# Patient Record
Sex: FEMALE | Race: WHITE | NOT HISPANIC OR LATINO
[De-identification: names, ages, dates, MRNs, and addresses within clinical notes are randomized per-mention and may not be internally consistent; named-entity substitution may affect disease eponyms.]

---

## 2017-01-05 ENCOUNTER — MEDICATION RENEWAL (OUTPATIENT)
Age: 62
End: 2017-01-05

## 2017-02-06 ENCOUNTER — APPOINTMENT (OUTPATIENT)
Dept: ENDOCRINOLOGY | Facility: CLINIC | Age: 62
End: 2017-02-06

## 2017-02-06 ENCOUNTER — MED ADMIN CHARGE (OUTPATIENT)
Age: 62
End: 2017-02-06

## 2017-02-06 RX ORDER — DENOSUMAB 60 MG/ML
60 INJECTION SUBCUTANEOUS
Qty: 1 | Refills: 0 | Status: COMPLETED | OUTPATIENT
Start: 2017-02-06

## 2017-02-06 RX ADMIN — DENOSUMAB 0 MG/ML: 60 INJECTION SUBCUTANEOUS at 00:00

## 2017-07-10 ENCOUNTER — RX RENEWAL (OUTPATIENT)
Age: 62
End: 2017-07-10

## 2017-07-20 ENCOUNTER — LABORATORY RESULT (OUTPATIENT)
Age: 62
End: 2017-07-20

## 2017-08-14 ENCOUNTER — APPOINTMENT (OUTPATIENT)
Dept: ENDOCRINOLOGY | Facility: CLINIC | Age: 62
End: 2017-08-14
Payer: COMMERCIAL

## 2017-08-14 PROCEDURE — 96401 CHEMO ANTI-NEOPL SQ/IM: CPT

## 2017-08-14 PROCEDURE — 99214 OFFICE O/P EST MOD 30 MIN: CPT | Mod: 25

## 2017-08-14 RX ORDER — DENOSUMAB 60 MG/ML
60 INJECTION SUBCUTANEOUS
Qty: 1 | Refills: 0 | Status: COMPLETED | OUTPATIENT
Start: 2017-08-14

## 2017-08-14 RX ADMIN — DENOSUMAB 0 MG/ML: 60 INJECTION SUBCUTANEOUS at 00:00

## 2018-01-16 ENCOUNTER — MEDICATION RENEWAL (OUTPATIENT)
Age: 63
End: 2018-01-16

## 2018-02-15 ENCOUNTER — TRANSCRIPTION ENCOUNTER (OUTPATIENT)
Age: 63
End: 2018-02-15

## 2018-02-15 ENCOUNTER — APPOINTMENT (OUTPATIENT)
Dept: ENDOCRINOLOGY | Facility: CLINIC | Age: 63
End: 2018-02-15
Payer: COMMERCIAL

## 2018-02-15 VITALS — DIASTOLIC BLOOD PRESSURE: 60 MMHG | SYSTOLIC BLOOD PRESSURE: 110 MMHG | HEART RATE: 54 BPM | OXYGEN SATURATION: 98 %

## 2018-02-15 VITALS — WEIGHT: 173 LBS | BODY MASS INDEX: 28.82 KG/M2 | HEIGHT: 65 IN

## 2018-02-15 PROCEDURE — 77080 DXA BONE DENSITY AXIAL: CPT

## 2018-02-15 PROCEDURE — ZZZZZ: CPT

## 2018-02-15 PROCEDURE — 99214 OFFICE O/P EST MOD 30 MIN: CPT | Mod: 25

## 2018-02-15 PROCEDURE — 96372 THER/PROPH/DIAG INJ SC/IM: CPT

## 2018-02-15 RX ORDER — DENOSUMAB 60 MG/ML
60 INJECTION SUBCUTANEOUS
Qty: 0 | Refills: 0 | Status: COMPLETED | OUTPATIENT
Start: 2018-02-15

## 2018-02-15 RX ADMIN — DENOSUMAB 0 MG/ML: 60 INJECTION SUBCUTANEOUS at 00:00

## 2018-06-29 ENCOUNTER — RX RENEWAL (OUTPATIENT)
Age: 63
End: 2018-06-29

## 2018-08-20 ENCOUNTER — LABORATORY RESULT (OUTPATIENT)
Age: 63
End: 2018-08-20

## 2018-08-20 ENCOUNTER — MEDICATION RENEWAL (OUTPATIENT)
Age: 63
End: 2018-08-20

## 2018-08-20 ENCOUNTER — APPOINTMENT (OUTPATIENT)
Dept: ENDOCRINOLOGY | Facility: CLINIC | Age: 63
End: 2018-08-20
Payer: COMMERCIAL

## 2018-08-20 ENCOUNTER — MED ADMIN CHARGE (OUTPATIENT)
Age: 63
End: 2018-08-20

## 2018-08-20 VITALS
OXYGEN SATURATION: 98 % | HEART RATE: 66 BPM | HEIGHT: 65 IN | DIASTOLIC BLOOD PRESSURE: 70 MMHG | SYSTOLIC BLOOD PRESSURE: 120 MMHG | BODY MASS INDEX: 26.99 KG/M2 | WEIGHT: 162 LBS

## 2018-08-20 PROCEDURE — 96372 THER/PROPH/DIAG INJ SC/IM: CPT

## 2018-08-20 PROCEDURE — 99214 OFFICE O/P EST MOD 30 MIN: CPT | Mod: 25

## 2018-08-20 RX ORDER — DENOSUMAB 60 MG/ML
60 INJECTION SUBCUTANEOUS
Qty: 0 | Refills: 0 | Status: COMPLETED | OUTPATIENT
Start: 2018-08-20

## 2018-08-20 RX ADMIN — DENOSUMAB 0 MG/ML: 60 INJECTION SUBCUTANEOUS at 00:00

## 2018-08-21 LAB
ALBUMIN SERPL ELPH-MCNC: 4.4 G/DL
ALP BLD-CCNC: 48 U/L
ALT SERPL-CCNC: 13 U/L
ANION GAP SERPL CALC-SCNC: 12 MMOL/L
AST SERPL-CCNC: 29 U/L
BILIRUB SERPL-MCNC: 0.7 MG/DL
BUN SERPL-MCNC: 18 MG/DL
CALCIUM SERPL-MCNC: 9.6 MG/DL
CHLORIDE SERPL-SCNC: 102 MMOL/L
CO2 SERPL-SCNC: 28 MMOL/L
CREAT SERPL-MCNC: 0.77 MG/DL
GLUCOSE SERPL-MCNC: 91 MG/DL
POTASSIUM SERPL-SCNC: 4.3 MMOL/L
PROT SERPL-MCNC: 6.7 G/DL
SODIUM SERPL-SCNC: 141 MMOL/L
T3RU NFR SERPL: 1.05 INDEX
T4 SERPL-MCNC: 8.6 UG/DL
TSH SERPL-ACNC: 0.55 UIU/ML

## 2018-08-22 ENCOUNTER — TRANSCRIPTION ENCOUNTER (OUTPATIENT)
Age: 63
End: 2018-08-22

## 2019-02-14 ENCOUNTER — MEDICATION RENEWAL (OUTPATIENT)
Age: 64
End: 2019-02-14

## 2019-02-26 ENCOUNTER — RX RENEWAL (OUTPATIENT)
Age: 64
End: 2019-02-26

## 2019-03-04 ENCOUNTER — LABORATORY RESULT (OUTPATIENT)
Age: 64
End: 2019-03-04

## 2019-03-04 ENCOUNTER — APPOINTMENT (OUTPATIENT)
Dept: ENDOCRINOLOGY | Facility: CLINIC | Age: 64
End: 2019-03-04
Payer: COMMERCIAL

## 2019-03-04 ENCOUNTER — TRANSCRIPTION ENCOUNTER (OUTPATIENT)
Age: 64
End: 2019-03-04

## 2019-03-04 VITALS
BODY MASS INDEX: 27.32 KG/M2 | HEART RATE: 69 BPM | HEIGHT: 65 IN | DIASTOLIC BLOOD PRESSURE: 70 MMHG | SYSTOLIC BLOOD PRESSURE: 120 MMHG | WEIGHT: 164 LBS | OXYGEN SATURATION: 98 %

## 2019-03-04 PROCEDURE — 99214 OFFICE O/P EST MOD 30 MIN: CPT | Mod: 25

## 2019-03-04 PROCEDURE — 96401 CHEMO ANTI-NEOPL SQ/IM: CPT

## 2019-03-04 RX ORDER — DENOSUMAB 60 MG/ML
60 INJECTION SUBCUTANEOUS
Qty: 1 | Refills: 0 | Status: COMPLETED | OUTPATIENT
Start: 2019-03-04

## 2019-03-04 RX ADMIN — DENOSUMAB 0 MG/ML: 60 INJECTION SUBCUTANEOUS at 00:00

## 2019-03-04 NOTE — REVIEW OF SYSTEMS
[Recent Weight Loss (___ Lbs)] : recent [unfilled] ~Ulb weight loss [Negative] : Heme/Lymph [All other systems negative] : All other systems negative

## 2019-03-05 LAB
ALBUMIN SERPL ELPH-MCNC: 4.4 G/DL
ALP BLD-CCNC: 70 U/L
ALT SERPL-CCNC: 23 U/L
ANION GAP SERPL CALC-SCNC: 14 MMOL/L
AST SERPL-CCNC: 29 U/L
BILIRUB SERPL-MCNC: 0.5 MG/DL
BUN SERPL-MCNC: 17 MG/DL
CALCIUM SERPL-MCNC: 9.8 MG/DL
CHLORIDE SERPL-SCNC: 103 MMOL/L
CO2 SERPL-SCNC: 25 MMOL/L
CREAT SERPL-MCNC: 0.67 MG/DL
GLUCOSE SERPL-MCNC: 101 MG/DL
POTASSIUM SERPL-SCNC: 4.6 MMOL/L
PROT SERPL-MCNC: 6.8 G/DL
SODIUM SERPL-SCNC: 142 MMOL/L
T3RU NFR SERPL: 1 TBI
T4 SERPL-MCNC: 8.1 UG/DL
TSH SERPL-ACNC: 0.44 UIU/ML

## 2019-03-05 NOTE — PROCEDURE
[FreeTextEntry1] : Bone mineral density 2/15/18\par indication: assess response to medication vs 2016\par spine -2.6 osteoporosis prior -2.8\par total hip -0.8 normal  no significant change \par femoral neck -1.2 osteopenia, no significant change \par proximal radius -0.9 normal no prior

## 2019-03-05 NOTE — ASSESSMENT
[FreeTextEntry1] : 63 year-old female with \par \par 1. Osteoporosis: pt had progressive decrease in spinal bone density despite long-term Actonel. H/o previous partial gastrectomy labs from 6/2016 showed normal Vit D levels. Began Prolia Feb 2017 tolerating well, taking correctly, no thigh pain. No ONJ. No interval fx. BMD 2/2018 slightly improved spine. I discussed that pt can not stop Prolia without expecting rapid bone loss and increase in risk for future fx. Further, there is 10 year safety data for Prolia. Continue Prolia from [facility].\par \par 2. H/o primary hypothyroidism: pt appears clinically and chemically euthyroid on LT4 112 mcg 6d/week. Answered questions about papillary thyroid CA in family member. \par \par I request labs sent out today. \par \par f/u in 6 mons.  [Denosumab Therapy] : Risks  and benefits of denosumab therapy were discussed with the patient including eczema, cellulitis, osteonecrosis of the jaw and atypical femur fractures

## 2019-03-05 NOTE — END OF VISIT
[FreeTextEntry3] : I, Radha Arriaza, authored this note working as a medical scribe for Dr. Garcia.  03/04/2019.  5:15PM. \par This note was authored by Radha Arriaza working as medical scribe for me. I have reviewed, edited, and revised the note as needed. I am in agreement with the exam findings, imaging findings, and treatment plan.  Jose Garcia MD

## 2019-03-05 NOTE — HISTORY OF PRESENT ILLNESS
[Alendronate (Fosomax)] : Alendronate [Risedronate (Actonel)] : Risedronate [Patient taking Meds Correctly] : Patient is taking meds correctly [Prolia (Denosumab)] : Prolia (Denosumab) [Regular Dental Follow-Up] : regular dental follow-up [FreeTextEntry1] : f/u 63 year-old female with osteoporosis.  \par \par Told of low bone density for many years. She took Fosamax for a few years and stopped because of dental implants. She then restarted rx as Actonel which she took for approximately 5 years. She took this correctly and tolerated this well. No significant osteoporosis related fx. Bone density test in February 2016 spine -2.8 slowly decreasing versus previous studies. Total hip  -0.7, femoral neck -1.3. No fhx of osteoporosis, had stress fx in the feet in the past. Began Prolia Feb 2017, tolerating well, no thigh pain, no interval fx. BMD 2/2018 slightly improved spine. Pt sees periodontist quarterly and DDS annually. No ONJ. \par \par Of note, partial gastrectomy 1981 for bleeding ulcer. Pt underwent Vit D evaluation 2016 and had normal Vit D 25OH level at 34.  \par \par H/o hypothyroidism and is currently on LT4 112 mcg 6d / week. No change in sx on decreased dose. She is tolerating this well. No sx of hypothyroidism or hyperthyroidism.\par \par Told of prediabetes. She had a 25 lbs weight loss, now weight has been stable. She had been suffering from reactive hypoglycemia but this appears resolved on a more carbohydrate consistent diet. [Disordered Eating] : no past or present history of disordered eating [Taking Steroids] : no past or present history of taking steroids [Kidney Stones] : no history of kidney stones [Family History of Osteoporosis] : no family history of osteoporosis [Family History of Breast Cancer] : no family history of breast cancer [Family History of Hip Fracture] : no family history of hip fracture [Hyperparathyroidism] : no hyperparathyroidism [History of Radiation Therapy] : no history of radiation therapy [Previous Fragility Fracture] : no previous fragility fracture

## 2019-08-16 RX ORDER — DENOSUMAB 60 MG/ML
60 INJECTION SUBCUTANEOUS
Qty: 1 | Refills: 1 | Status: DISCONTINUED | COMMUNITY
Start: 2017-01-05 | End: 2019-08-16

## 2019-09-05 ENCOUNTER — APPOINTMENT (OUTPATIENT)
Dept: ENDOCRINOLOGY | Facility: CLINIC | Age: 64
End: 2019-09-05
Payer: COMMERCIAL

## 2019-09-05 VITALS
WEIGHT: 150 LBS | DIASTOLIC BLOOD PRESSURE: 62 MMHG | HEART RATE: 67 BPM | SYSTOLIC BLOOD PRESSURE: 104 MMHG | OXYGEN SATURATION: 98 % | BODY MASS INDEX: 24.99 KG/M2 | HEIGHT: 65 IN

## 2019-09-05 PROCEDURE — 96401 CHEMO ANTI-NEOPL SQ/IM: CPT

## 2019-09-05 PROCEDURE — 99214 OFFICE O/P EST MOD 30 MIN: CPT | Mod: 25

## 2019-09-05 RX ORDER — DENOSUMAB 60 MG/ML
60 INJECTION SUBCUTANEOUS
Qty: 1 | Refills: 0 | Status: COMPLETED | OUTPATIENT
Start: 2019-09-05

## 2019-09-05 RX ADMIN — DENOSUMAB 60 MG/ML: 60 INJECTION SUBCUTANEOUS at 00:00

## 2019-09-05 NOTE — END OF VISIT
[FreeTextEntry3] : I, Woodrow Robert, authored this note working as a medical scribe for Dr. Garcia.  09/05/2019.  3:45PM. This note was authored by the medical scribe for me. I have reviewed, edited, and revised the note as needed. I am in agreement with the exam findings, imaging findings, and treatment plan.  Jose Garcia MD

## 2019-09-05 NOTE — PHYSICAL EXAM
[Alert] : alert [No Acute Distress] : no acute distress [Well Nourished] : well nourished [Well Developed] : well developed [Normal Sclera/Conjunctiva] : normal sclera/conjunctiva [No Proptosis] : no proptosis [EOMI] : extra ocular movement intact [Normal Oropharynx] : the oropharynx was normal [Thyroid Not Enlarged] : the thyroid was not enlarged [No Thyroid Nodules] : there were no palpable thyroid nodules [No Respiratory Distress] : no respiratory distress [No Accessory Muscle Use] : no accessory muscle use [Clear to Auscultation] : lungs were clear to auscultation bilaterally [Normal Rate] : heart rate was normal  [Normal S1, S2] : normal S1 and S2 [Regular Rhythm] : with a regular rhythm [Normal Bowel Sounds] : normal bowel sounds [Not Tender] : non-tender [Soft] : abdomen soft [Not Distended] : not distended [Post Cervical Nodes] : posterior cervical nodes [Anterior Cervical Nodes] : anterior cervical nodes [Normal] : normal and non tender [No Spinal Tenderness] : no spinal tenderness [Spine Straight] : spine straight [No Stigmata of Cushings Syndrome] : no stigmata of cushings syndrome [Normal Strength/Tone] : muscle strength and tone were normal [Normal Gait] : normal gait [No Rash] : no rash [Normal Reflexes] : deep tendon reflexes were 2+ and symmetric [No Tremors] : no tremors [Oriented x3] : oriented to person, place, and time [Acanthosis Nigricans] : no acanthosis nigricans

## 2019-09-05 NOTE — ASSESSMENT
[Denosumab Therapy] : Risks  and benefits of denosumab therapy were discussed with the patient including eczema, cellulitis, osteonecrosis of the jaw and atypical femur fractures [FreeTextEntry1] : 63 year-old female with \par \par 1. Osteoporosis: pt had progressive decrease in spinal bone density despite long-term Actonel. H/o previous partial gastrectomy labs from 6/2016 showed normal Vit D levels. Began Prolia Feb 2017 tolerating well, taking correctly, no thigh pain. No ONJ. No interval fx. BMD 2/2018 slightly improved spine. I discussed that pt can not stop Prolia without expecting rapid bone loss and increase in risk for future fx. Further, there is 10 year safety data for Prolia. \par \par Continue Prolia from [facility]. \par \par 2. H/o primary hypothyroidism: pt appears clinically and chemically euthyroid on LT4 112 mcg  TSH 0.27. Essentially stable.\par \par f/u in 6 mons with repeat BMD.

## 2020-02-21 ENCOUNTER — RX RENEWAL (OUTPATIENT)
Age: 65
End: 2020-02-21

## 2020-03-27 ENCOUNTER — TRANSCRIPTION ENCOUNTER (OUTPATIENT)
Age: 65
End: 2020-03-27

## 2020-04-28 ENCOUNTER — TRANSCRIPTION ENCOUNTER (OUTPATIENT)
Age: 65
End: 2020-04-28

## 2020-05-04 ENCOUNTER — APPOINTMENT (OUTPATIENT)
Dept: ENDOCRINOLOGY | Facility: CLINIC | Age: 65
End: 2020-05-04

## 2020-05-04 ENCOUNTER — LABORATORY RESULT (OUTPATIENT)
Age: 65
End: 2020-05-04

## 2020-05-04 ENCOUNTER — APPOINTMENT (OUTPATIENT)
Dept: ENDOCRINOLOGY | Facility: CLINIC | Age: 65
End: 2020-05-04
Payer: COMMERCIAL

## 2020-05-04 VITALS — TEMPERATURE: 97.8 F | SYSTOLIC BLOOD PRESSURE: 118 MMHG | DIASTOLIC BLOOD PRESSURE: 70 MMHG

## 2020-05-04 PROCEDURE — 96401 CHEMO ANTI-NEOPL SQ/IM: CPT

## 2020-05-04 PROCEDURE — 99214 OFFICE O/P EST MOD 30 MIN: CPT | Mod: 25

## 2020-05-04 RX ORDER — MULTIVITAMIN
TABLET ORAL
Refills: 0 | Status: ACTIVE | COMMUNITY

## 2020-05-04 RX ORDER — DENOSUMAB 60 MG/ML
60 INJECTION SUBCUTANEOUS
Qty: 1 | Refills: 0 | Status: COMPLETED | OUTPATIENT
Start: 2020-05-04

## 2020-05-04 RX ADMIN — DENOSUMAB 60 MG/ML: 60 INJECTION SUBCUTANEOUS at 00:00

## 2020-05-04 NOTE — PHYSICAL EXAM
[Alert] : alert [No Acute Distress] : no acute distress [Well Nourished] : well nourished [Normal Sclera/Conjunctiva] : normal sclera/conjunctiva [EOMI] : extra ocular movement intact [Well Developed] : well developed [Normal Oropharynx] : the oropharynx was normal [Thyroid Not Enlarged] : the thyroid was not enlarged [No Proptosis] : no proptosis [No Thyroid Nodules] : no palpable thyroid nodules [Well Healed Scar] : well healed scar [No Accessory Muscle Use] : no accessory muscle use [No Respiratory Distress] : no respiratory distress [Clear to Auscultation] : lungs were clear to auscultation bilaterally [Normal S1, S2] : normal S1 and S2 [Normal Rate] : heart rate was normal [Regular Rhythm] : with a regular rhythm [No Edema] : no peripheral edema [Normal Bowel Sounds] : normal bowel sounds [Not Tender] : non-tender [Soft] : abdomen soft [Not Distended] : not distended [Normal Anterior Cervical Nodes] : no anterior cervical lymphadenopathy [Normal Posterior Cervical Nodes] : no posterior cervical lymphadenopathy [No Spinal Tenderness] : no spinal tenderness [Spine Straight] : spine straight [No Stigmata of Cushings Syndrome] : no stigmata of Cushings Syndrome [Normal Gait] : normal gait [Normal Strength/Tone] : muscle strength and tone were normal [No Rash] : no rash [Normal Reflexes] : deep tendon reflexes were 2+ and symmetric [Acanthosis Nigricans] : no acanthosis nigricans [Oriented x3] : oriented to person, place, and time [No Tremors] : no tremors

## 2020-05-04 NOTE — HISTORY OF PRESENT ILLNESS
[Alendronate (Fosomax)] : Alendronate [Risedronate (Actonel)] : Risedronate [Patient taking Meds Correctly] : Patient is taking meds correctly [Prolia (Denosumab)] : Prolia (Denosumab) [Regular Dental Follow-Up] : regular dental follow-up [FreeTextEntry1] :  \par Told of low bone density for many years. She took Fosamax for a few years and stopped because of dental implants. She then restarted rx as Actonel which she took for approximately 5 years. She took this correctly and tolerated this well. No significant osteoporosis related fx. Bone density test in February 2016 spine -2.8 slowly decreasing versus previous studies. Total hip  -0.7, femoral neck -1.3. No fhx of osteoporosis, had stress fx in the feet in the past. Began Prolia Feb 2017, tolerating well, no thigh pain, no interval fx. BMD 2/2018 slightly improved spine. Pt sees periodontist quarterly and DDS annually. No ONJ. \par \par Of note, partial gastrectomy 1981 for bleeding ulcer. Pt underwent Vit D evaluation 2016 and had normal Vit D 25OH level at 34.  \par \par H/o hypothyroidism and is currently on LT4 112 mcg  No change in sx on decreased dose. She is tolerating well. No sx of hypothyroidism or hyperthyroidism.\par \par Told of prediabetes. She had a 25 lbs weight loss, now weight has been stable. She had been suffering from reactive hypoglycemia but this appears resolved on a more carbohydrate consistent diet. [Disordered Eating] : no past or present history of disordered eating [Taking Steroids] : no past or present history of taking steroids [Family History of Osteoporosis] : no family history of osteoporosis [Kidney Stones] : no history of kidney stones [Family History of Breast Cancer] : no family history of breast cancer [Family History of Hip Fracture] : no family history of hip fracture [Hyperparathyroidism] : no hyperparathyroidism [History of Radiation Therapy] : no history of radiation therapy [Previous Fragility Fracture] : no previous fragility fracture

## 2020-05-04 NOTE — ASSESSMENT
[Denosumab Therapy] : Risks  and benefits of denosumab therapy were discussed with the patient including eczema, cellulitis, osteonecrosis of the jaw and atypical femur fractures [FreeTextEntry1] : 64 year-old female with \par \par 1. Osteoporosis: pt had progressive decrease in spinal bone density despite long-term Actonel. H/o previous partial gastrectomy labs from 6/2016 showed normal Vit D levels. Began Prolia Feb 2017 tolerating well, taking correctly, no thigh pain. No ONJ. No interval fx. BMD 2/2018 slightly improved spine. I discussed that pt can not stop Prolia without expecting rapid bone loss and increase in risk for future fx. due for bone density test today but patient wishes to delay due to concerns about COVID\par \par Continue Prolia from [facility]\par \par 2. H/o primary hypothyroidism: pt appears clinically and chemically euthyroid on LT4 112 mcg  \par  3.  h/o low vit D; check labs\par \par f/u in 6 mons with repeat BMD.

## 2020-05-05 LAB
25(OH)D3 SERPL-MCNC: 35.2 NG/ML
ALBUMIN SERPL ELPH-MCNC: 4.4 G/DL
ALP BLD-CCNC: 53 U/L
ALT SERPL-CCNC: 17 U/L
ANION GAP SERPL CALC-SCNC: 13 MMOL/L
AST SERPL-CCNC: 25 U/L
BILIRUB SERPL-MCNC: 0.5 MG/DL
BUN SERPL-MCNC: 19 MG/DL
CALCIUM SERPL-MCNC: 9.6 MG/DL
CHLORIDE SERPL-SCNC: 105 MMOL/L
CO2 SERPL-SCNC: 24 MMOL/L
CREAT SERPL-MCNC: 0.69 MG/DL
GLUCOSE SERPL-MCNC: 96 MG/DL
POTASSIUM SERPL-SCNC: 4.3 MMOL/L
PROT SERPL-MCNC: 6.3 G/DL
SODIUM SERPL-SCNC: 142 MMOL/L
T3RU NFR SERPL: 1 TBI
T4 SERPL-MCNC: 7.9 UG/DL
TSH SERPL-ACNC: 0.45 UIU/ML

## 2020-11-09 ENCOUNTER — APPOINTMENT (OUTPATIENT)
Dept: ENDOCRINOLOGY | Facility: CLINIC | Age: 65
End: 2020-11-09
Payer: COMMERCIAL

## 2020-11-09 VITALS
BODY MASS INDEX: 24.32 KG/M2 | DIASTOLIC BLOOD PRESSURE: 80 MMHG | OXYGEN SATURATION: 98 % | TEMPERATURE: 98 F | HEART RATE: 59 BPM | WEIGHT: 146 LBS | SYSTOLIC BLOOD PRESSURE: 114 MMHG | HEIGHT: 65 IN

## 2020-11-09 PROCEDURE — ZZZZZ: CPT

## 2020-11-09 PROCEDURE — 99072 ADDL SUPL MATRL&STAF TM PHE: CPT

## 2020-11-09 PROCEDURE — 99214 OFFICE O/P EST MOD 30 MIN: CPT | Mod: 25

## 2020-11-09 PROCEDURE — 96401 CHEMO ANTI-NEOPL SQ/IM: CPT

## 2020-11-09 PROCEDURE — 77080 DXA BONE DENSITY AXIAL: CPT

## 2020-11-09 RX ORDER — DENOSUMAB 60 MG/ML
60 INJECTION SUBCUTANEOUS
Qty: 1 | Refills: 0 | Status: COMPLETED | OUTPATIENT
Start: 2020-11-09

## 2020-11-09 RX ORDER — ONDANSETRON 8 MG/1
8 TABLET ORAL
Qty: 9 | Refills: 0 | Status: COMPLETED | COMMUNITY
Start: 2020-08-31

## 2020-11-09 RX ORDER — FAMOTIDINE 40 MG/1
TABLET, FILM COATED ORAL
Refills: 0 | Status: ACTIVE | COMMUNITY

## 2020-11-09 RX ADMIN — DENOSUMAB 60 MG/ML: 60 INJECTION SUBCUTANEOUS at 00:00

## 2020-11-10 NOTE — PROCEDURE
[FreeTextEntry1] : Bone mineral density November 9, 2020\par indication: Compared to 2018\par spine spine -2.0 excluding L2 osteopenia +7.5%\par total hip -0.7 normal no significant change\par femoral neck -0.8 normal +6.3%\par proximal radius -1.2 osteopenia no significant change\par \par Bone mineral density 2/15/18\par indication: assess response to medication vs 2016\par spine -2.6 osteoporosis prior -2.8\par total hip -0.8 normal  no significant change \par femoral neck -1.2 osteopenia, no significant change \par proximal radius -0.9 normal no prior

## 2020-11-10 NOTE — ASSESSMENT
[Denosumab Therapy] : Risks  and benefits of denosumab therapy were discussed with the patient including eczema, cellulitis, osteonecrosis of the jaw and atypical femur fractures [FreeTextEntry1] : 65 year-old female with \par \par 1. Osteoporosis: pt had progressive decrease in spinal bone density despite long-term Actonel. H/o previous partial gastrectomy labs from 6/2016 showed normal Vit D levels. Began Prolia Feb 2017 tolerating well, taking correctly, no thigh pain. No ONJ. No interval fx. BMD 2/2018 slightly improved spine. I discussed that pt can not stop Prolia without expecting rapid bone loss and increase in risk for future fx. options of switching to bisphosphonate discussed.  We will continue Prolia for now.\par \par Continue Prolia from [facility]\par \par 2. H/o primary hypothyroidism: pt appears clinically and chemically euthyroid on LT4 112 mcg  \par  3.  h/o low vit D; check labs\par \par f/u in 6 mons

## 2020-11-10 NOTE — PHYSICAL EXAM
[Alert] : alert [Well Nourished] : well nourished [No Acute Distress] : no acute distress [Well Developed] : well developed [Normal Sclera/Conjunctiva] : normal sclera/conjunctiva [EOMI] : extra ocular movement intact [No Proptosis] : no proptosis [Normal Oropharynx] : the oropharynx was normal [Thyroid Not Enlarged] : the thyroid was not enlarged [No Thyroid Nodules] : no palpable thyroid nodules [Clear to Auscultation] : lungs were clear to auscultation bilaterally [Normal S1, S2] : normal S1 and S2 [Normal Rate] : heart rate was normal [Regular Rhythm] : with a regular rhythm [No Edema] : no peripheral edema [Normal Bowel Sounds] : normal bowel sounds [Not Tender] : non-tender [Not Distended] : not distended [Soft] : abdomen soft [Normal Anterior Cervical Nodes] : no anterior cervical lymphadenopathy [Normal Posterior Cervical Nodes] : no posterior cervical lymphadenopathy [No Spinal Tenderness] : no spinal tenderness [Spine Straight] : spine straight [No Stigmata of Cushings Syndrome] : no stigmata of Cushings Syndrome [Normal Gait] : normal gait [Normal Strength/Tone] : muscle strength and tone were normal [No Rash] : no rash [Normal Reflexes] : deep tendon reflexes were 2+ and symmetric [No Tremors] : no tremors [Oriented x3] : oriented to person, place, and time [Acanthosis Nigricans] : no acanthosis nigricans

## 2020-11-10 NOTE — HISTORY OF PRESENT ILLNESS
[Alendronate (Fosomax)] : Alendronate [Risedronate (Actonel)] : Risedronate [Patient taking Meds Correctly] : Patient is taking meds correctly [Prolia (Denosumab)] : Prolia (Denosumab) [Regular Dental Follow-Up] : regular dental follow-up [FreeTextEntry1] : No significant interval health changes.  No interval surgery, hospitalizations, fractures, or change in medications. \par  \par Told of low bone density for many years. She took Fosamax for a few years and stopped because of dental implants. She then restarted rx as Actonel which she took for approximately 5 years. She took this correctly and tolerated this well. No significant osteoporosis related fx. Bone density test in February 2016 spine -2.8 slowly decreasing versus previous studies. Total hip  -0.7, femoral neck -1.3. No fhx of osteoporosis, had stress fx in the feet in the past. Began Prolia Feb 2017, tolerating well, no thigh pain, no interval fx. BMD 2/2018 slightly improved spine. Pt sees periodontist quarterly and DDS annually. No ONJ. \par \par Of note, partial gastrectomy 1981 for bleeding ulcer. Pt underwent Vit D evaluation 2016 and had normal Vit D 25OH level at 34.  \par \par H/o hypothyroidism and is currently on LT4 112 mcg  No change in sx on decreased dose. She is tolerating well. No sx of hypothyroidism or hyperthyroidism.\par \par Told of prediabetes. She had a 25 lbs weight loss, now weight has been stable. She had been suffering from reactive hypoglycemia but this appears resolved on a more carbohydrate consistent diet. [Disordered Eating] : no past or present history of disordered eating [Taking Steroids] : no past or present history of taking steroids [Kidney Stones] : no history of kidney stones [Family History of Osteoporosis] : no family history of osteoporosis [Family History of Breast Cancer] : no family history of breast cancer [Family History of Hip Fracture] : no family history of hip fracture [Hyperparathyroidism] : no hyperparathyroidism [History of Radiation Therapy] : no history of radiation therapy [Previous Fragility Fracture] : no previous fragility fracture

## 2020-12-12 ENCOUNTER — TRANSCRIPTION ENCOUNTER (OUTPATIENT)
Age: 65
End: 2020-12-12

## 2021-01-04 NOTE — HISTORY OF PRESENT ILLNESS
Informed that the  Client incubated quantiferon tb test prior to next remicade.                                                                                           [Alendronate (Fosomax)] : Alendronate [Risedronate (Actonel)] : Risedronate [Patient taking Meds Correctly] : Patient is taking meds correctly [Prolia (Denosumab)] : Prolia (Denosumab) [Regular Dental Follow-Up] : regular dental follow-up [FreeTextEntry1] : 63 year-old female with osteoporosis.  \par \par Told of low bone density for many years. She took Fosamax for a few years and stopped because of dental implants. She then restarted rx as Actonel which she took for approximately 5 years. She took this correctly and tolerated this well. No significant osteoporosis related fx. Bone density test in February 2016 spine -2.8 slowly decreasing versus previous studies. Total hip  -0.7, femoral neck -1.3. No fhx of osteoporosis, had stress fx in the feet in the past. Began Prolia Feb 2017, tolerating well, no thigh pain, no interval fx. BMD 2/2018 slightly improved spine. Pt sees periodontist quarterly and DDS annually. No ONJ. \par \par Of note, partial gastrectomy 1981 for bleeding ulcer. Pt underwent Vit D evaluation 2016 and had normal Vit D 25OH level at 34.  \par \par H/o hypothyroidism and is currently on LT4 112 mcg 6d / week. No change in sx on decreased dose. She is tolerating well. No sx of hypothyroidism or hyperthyroidism.\par \par Told of prediabetes. She had a 25 lbs weight loss, now weight has been stable. She had been suffering from reactive hypoglycemia but this appears resolved on a more carbohydrate consistent diet. [Disordered Eating] : no past or present history of disordered eating [Taking Steroids] : no past or present history of taking steroids [Kidney Stones] : no history of kidney stones [Family History of Osteoporosis] : no family history of osteoporosis [Family History of Breast Cancer] : no family history of breast cancer [Family History of Hip Fracture] : no family history of hip fracture [Hyperparathyroidism] : no hyperparathyroidism [History of Radiation Therapy] : no history of radiation therapy [Previous Fragility Fracture] : no previous fragility fracture

## 2021-01-13 ENCOUNTER — TRANSCRIPTION ENCOUNTER (OUTPATIENT)
Age: 66
End: 2021-01-13

## 2021-05-10 ENCOUNTER — APPOINTMENT (OUTPATIENT)
Dept: ENDOCRINOLOGY | Facility: CLINIC | Age: 66
End: 2021-05-10
Payer: COMMERCIAL

## 2021-05-10 PROCEDURE — 96401 CHEMO ANTI-NEOPL SQ/IM: CPT

## 2021-05-10 PROCEDURE — 99072 ADDL SUPL MATRL&STAF TM PHE: CPT

## 2021-05-10 RX ORDER — DENOSUMAB 60 MG/ML
60 INJECTION SUBCUTANEOUS
Qty: 1 | Refills: 0 | Status: COMPLETED | OUTPATIENT
Start: 2021-05-10

## 2021-05-10 RX ADMIN — DENOSUMAB 0 MG/ML: 60 INJECTION SUBCUTANEOUS at 00:00

## 2021-05-11 LAB
25(OH)D3 SERPL-MCNC: 54.2 NG/ML
ALBUMIN SERPL ELPH-MCNC: 4.7 G/DL
ALP BLD-CCNC: 60 U/L
ALT SERPL-CCNC: 23 U/L
ANION GAP SERPL CALC-SCNC: 13 MMOL/L
AST SERPL-CCNC: 33 U/L
BILIRUB SERPL-MCNC: 0.6 MG/DL
BUN SERPL-MCNC: 23 MG/DL
CALCIUM SERPL-MCNC: 9.8 MG/DL
CHLORIDE SERPL-SCNC: 100 MMOL/L
CO2 SERPL-SCNC: 28 MMOL/L
CREAT SERPL-MCNC: 0.82 MG/DL
GLUCOSE SERPL-MCNC: 88 MG/DL
POTASSIUM SERPL-SCNC: 4.4 MMOL/L
PROT SERPL-MCNC: 6.8 G/DL
SODIUM SERPL-SCNC: 141 MMOL/L
TSH SERPL-ACNC: 0.91 UIU/ML

## 2021-08-09 ENCOUNTER — TRANSCRIPTION ENCOUNTER (OUTPATIENT)
Age: 66
End: 2021-08-09

## 2021-10-11 ENCOUNTER — RX RENEWAL (OUTPATIENT)
Age: 66
End: 2021-10-11

## 2021-10-13 ENCOUNTER — TRANSCRIPTION ENCOUNTER (OUTPATIENT)
Age: 66
End: 2021-10-13

## 2021-11-16 ENCOUNTER — APPOINTMENT (OUTPATIENT)
Dept: ENDOCRINOLOGY | Facility: CLINIC | Age: 66
End: 2021-11-16
Payer: COMMERCIAL

## 2021-11-16 VITALS
OXYGEN SATURATION: 97 % | HEART RATE: 72 BPM | WEIGHT: 148 LBS | BODY MASS INDEX: 24.63 KG/M2 | TEMPERATURE: 98 F | SYSTOLIC BLOOD PRESSURE: 118 MMHG | DIASTOLIC BLOOD PRESSURE: 68 MMHG

## 2021-11-16 DIAGNOSIS — E55.9 VITAMIN D DEFICIENCY, UNSPECIFIED: ICD-10-CM

## 2021-11-16 DIAGNOSIS — L65.9 NONSCARRING HAIR LOSS, UNSPECIFIED: ICD-10-CM

## 2021-11-16 PROCEDURE — 96401 CHEMO ANTI-NEOPL SQ/IM: CPT

## 2021-11-16 PROCEDURE — 99214 OFFICE O/P EST MOD 30 MIN: CPT | Mod: 25

## 2021-11-16 RX ORDER — DENOSUMAB 60 MG/ML
60 INJECTION SUBCUTANEOUS
Qty: 1 | Refills: 0 | Status: COMPLETED | OUTPATIENT
Start: 2021-11-16

## 2021-11-16 RX ADMIN — DENOSUMAB 60 MG/ML: 60 INJECTION SUBCUTANEOUS at 00:00

## 2021-11-17 NOTE — PHYSICAL EXAM
[Alert] : alert [Well Nourished] : well nourished [No Acute Distress] : no acute distress [Well Developed] : well developed [Normal Sclera/Conjunctiva] : normal sclera/conjunctiva [EOMI] : extra ocular movement intact [No Proptosis] : no proptosis [Thyroid Not Enlarged] : the thyroid was not enlarged [No Thyroid Nodules] : no palpable thyroid nodules [Clear to Auscultation] : lungs were clear to auscultation bilaterally [Normal S1, S2] : normal S1 and S2 [Normal Rate] : heart rate was normal [Regular Rhythm] : with a regular rhythm [No Edema] : no peripheral edema [Normal Bowel Sounds] : normal bowel sounds [Not Tender] : non-tender [Not Distended] : not distended [Soft] : abdomen soft [Normal Anterior Cervical Nodes] : no anterior cervical lymphadenopathy [No Spinal Tenderness] : no spinal tenderness [Spine Straight] : spine straight [No Stigmata of Cushings Syndrome] : no stigmata of Cushings Syndrome [Normal Gait] : normal gait [Normal Reflexes] : deep tendon reflexes were 2+ and symmetric [No Tremors] : no tremors [Oriented x3] : oriented to person, place, and time [Hirsutism] : no hirsutism [de-identified] : No unwanted hair growth face, chest, back, or abdomen; no loss of hairlinem no alopecia

## 2021-11-17 NOTE — END OF VISIT
[FreeTextEntry3] : I, Edwin Martinez, authored this note working as a medical scribe for Dr. Garcia.  11/16/2021.  2:45PM. This note was authored by the medical scribe for me. I have reviewed, edited, and revised the note as needed. I am in agreement with the exam findings, imaging findings, and treatment plan.  Jose Garcia MD

## 2021-11-17 NOTE — HISTORY OF PRESENT ILLNESS
[Alendronate (Fosomax)] : Alendronate [Risedronate (Actonel)] : Risedronate [Denosumab (Prolia)] : Denosumab [FreeTextEntry1] : No significant interval health changes. No interval surgery, hospitalizations, fractures, or change in medications.\par \par Told of low bone density for many years. She took Fosamax for a few years and stopped because of dental implants. She then restarted rx as Actonel which she took for approximately 5 years. She took this correctly and tolerated this well. No significant osteoporosis related fx. Bone density test in February 2016 spine -2.8 slowly decreasing versus previous studies. Total hip  -0.7, femoral neck -1.3. No fhx of osteoporosis, had stress fx in the feet in the past. Began Prolia Feb 2017. Tolerating well. No thigh pain, no interval fx. Normal Ca. Last DDS within past 6 months. No ONJ. Not planning major dental work. BMD 2/2018 slightly improved spine. BMD 11/2020 indicates significantly improved now osteopenia in spine, stable normal total hip, improved normal femoral neck, and stable osteopenia in proximal radius.\par \par H/o hypothyroidism and is currently on LT4 112 mcg daily. No change in sx on decreased dose. She is tolerating well. No sx of hypothyroidism or hyperthyroidism. No local neck pain. No dysphagia or dysphonia. No raciness, shakiness, tiredness, or fatigue. No palpitations, tremors, or sudden weight gain/loss. However, pt c/o severe cold intolerance.\par \par Pt c/o hair loss.\par \par Told of prediabetes. She had a 25 lbs weight loss, now weight has been stable. She had been suffering from reactive hypoglycemia but this appears resolved on a more carbohydrate consistent diet.\par \par Of note, partial gastrectomy 1981 for bleeding ulcer. Pt underwent Vit D evaluation 2016 and had normal Vit D 25OH level at 34.

## 2021-11-17 NOTE — ASSESSMENT
[Denosumab Therapy] : Risks  and benefits of denosumab therapy were discussed with the patient including eczema, cellulitis, osteonecrosis of the jaw and atypical femur fractures [Levothyroxine] : The patient was instructed to take Levothyroxine on an empty stomach, separate from vitamins, and wait at least 30 minutes before eating [FreeTextEntry1] : 66 year-old female with \par \par 1. Osteoporosis: pt had progressive decrease in spinal bone density despite long-term Actonel. H/o previous partial gastrectomy labs from 6/2016 showed normal Vitamin D levels. Began Prolia 2/2017. Tolerating well. No thigh pain, no interval fx. Normal Ca. No ONJ. BMD 2/2018 slightly improved spine. BMD 11/2020 indicates significantly improved now osteopenia in spine, stable normal total hip, improved normal femoral neck, and stable osteopenia in proximal radius. Continue Prolia, from [facility].\par \par 2. H/o primary hypothyroidism: pt appears clinically and chemically euthyroid on LT4 112 mcg daily. No local neck pain. No dysphagia or dysphonia. No raciness, shakiness, tiredness, or fatigue. No palpitations, tremors, or sudden weight gain/loss. However, pt c/o severe cold intolerance.\par \par Pt c/o hair loss. No unwanted hair growth face, chest, back, or abdomen; no loss of hairline or hirsutism. Recommend pt f/u w/ dermatology. She can try topical Minoxidil (Rogaine) and see if there is any improvement.\par \par Request labs sent out. Repeat TFT. Request morning testosterone.\par \par F/u in 6 months

## 2021-11-18 ENCOUNTER — LABORATORY RESULT (OUTPATIENT)
Age: 66
End: 2021-11-18

## 2021-11-19 LAB
25(OH)D3 SERPL-MCNC: 51.5 NG/ML
ALBUMIN SERPL ELPH-MCNC: 4.4 G/DL
ALP BLD-CCNC: 60 U/L
ALT SERPL-CCNC: 20 U/L
ANION GAP SERPL CALC-SCNC: 18 MMOL/L
AST SERPL-CCNC: 23 U/L
BASOPHILS # BLD AUTO: 0.07 K/UL
BASOPHILS NFR BLD AUTO: 1 %
BILIRUB SERPL-MCNC: 0.5 MG/DL
BUN SERPL-MCNC: 22 MG/DL
CALCIUM SERPL-MCNC: 9.4 MG/DL
CHLORIDE SERPL-SCNC: 101 MMOL/L
CO2 SERPL-SCNC: 20 MMOL/L
CORTIS SERPL-MCNC: 11.4 UG/DL
CREAT SERPL-MCNC: 0.73 MG/DL
DHEA-S SERPL-MCNC: 43.8 UG/DL
EOSINOPHIL # BLD AUTO: 0.14 K/UL
EOSINOPHIL NFR BLD AUTO: 2 %
ESTIMATED AVERAGE GLUCOSE: 114 MG/DL
FERRITIN SERPL-MCNC: 18 NG/ML
GLUCOSE SERPL-MCNC: 103 MG/DL
HBA1C MFR BLD HPLC: 5.6 %
HCT VFR BLD CALC: 38.2 %
HGB BLD-MCNC: 12.7 G/DL
IMM GRANULOCYTES NFR BLD AUTO: 0.1 %
LYMPHOCYTES # BLD AUTO: 2.84 K/UL
LYMPHOCYTES NFR BLD AUTO: 41.6 %
MAN DIFF?: NORMAL
MCHC RBC-ENTMCNC: 32.6 PG
MCHC RBC-ENTMCNC: 33.2 GM/DL
MCV RBC AUTO: 98.2 FL
MONOCYTES # BLD AUTO: 0.57 K/UL
MONOCYTES NFR BLD AUTO: 8.3 %
NEUTROPHILS # BLD AUTO: 3.2 K/UL
NEUTROPHILS NFR BLD AUTO: 47 %
PLATELET # BLD AUTO: 186 K/UL
POTASSIUM SERPL-SCNC: 4.4 MMOL/L
PROT SERPL-MCNC: 6.5 G/DL
RBC # BLD: 3.89 M/UL
RBC # FLD: 12.9 %
SODIUM SERPL-SCNC: 140 MMOL/L
T3RU NFR SERPL: 1 TBI
T4 SERPL-MCNC: 8 UG/DL
TSH SERPL-ACNC: 1.42 UIU/ML
WBC # FLD AUTO: 6.83 K/UL

## 2021-11-21 LAB
ANA PAT FLD IF-IMP: ABNORMAL
ANA SER IF-ACNC: ABNORMAL

## 2021-11-26 ENCOUNTER — TRANSCRIPTION ENCOUNTER (OUTPATIENT)
Age: 66
End: 2021-11-26

## 2022-01-16 ENCOUNTER — TRANSCRIPTION ENCOUNTER (OUTPATIENT)
Age: 67
End: 2022-01-16

## 2022-03-26 ENCOUNTER — TRANSCRIPTION ENCOUNTER (OUTPATIENT)
Age: 67
End: 2022-03-26

## 2022-05-23 ENCOUNTER — APPOINTMENT (OUTPATIENT)
Dept: ENDOCRINOLOGY | Facility: CLINIC | Age: 67
End: 2022-05-23
Payer: COMMERCIAL

## 2022-05-23 VITALS
HEIGHT: 65 IN | DIASTOLIC BLOOD PRESSURE: 70 MMHG | WEIGHT: 152 LBS | BODY MASS INDEX: 25.33 KG/M2 | HEART RATE: 74 BPM | TEMPERATURE: 96.8 F | SYSTOLIC BLOOD PRESSURE: 120 MMHG | OXYGEN SATURATION: 98 %

## 2022-05-23 PROCEDURE — 99214 OFFICE O/P EST MOD 30 MIN: CPT | Mod: 25

## 2022-05-23 PROCEDURE — 96401 CHEMO ANTI-NEOPL SQ/IM: CPT

## 2022-05-23 RX ORDER — DENOSUMAB 60 MG/ML
60 INJECTION SUBCUTANEOUS
Qty: 1 | Refills: 0 | Status: COMPLETED | OUTPATIENT
Start: 2022-05-23

## 2022-05-23 RX ADMIN — DENOSUMAB 60 MG/ML: 60 INJECTION SUBCUTANEOUS at 00:00

## 2022-05-24 NOTE — ASSESSMENT
[Denosumab Therapy] : Risks  and benefits of denosumab therapy were discussed with the patient including eczema, cellulitis, osteonecrosis of the jaw and atypical femur fractures [Levothyroxine] : The patient was instructed to take Levothyroxine on an empty stomach, separate from vitamins, and wait at least 30 minutes before eating [FreeTextEntry1] : 66 year-old female with \par \par 1. Osteoporosis: pt had progressive decrease in spinal bone density despite long-term Actonel. H/o previous partial gastrectomy labs from 6/2016 showed normal Vitamin D levels. Began Prolia 2/2017. Tolerating well. No thigh pain, no interval fx. Normal Ca. No ONJ. BMD 2/2018 slightly improved spine. BMD 11/2020 indicates significantly improved now osteopenia in spine, stable normal total hip, improved normal femoral neck, and stable osteopenia in proximal radius. Continue Prolia, from [facility].\par \par 2. H/o primary hypothyroidism: pt appears clinically and chemically euthyroid on LT4 112 mcg daily. No local neck pain. No dysphagia or dysphonia. No raciness, shakiness, tiredness, or fatigue. No palpitations, tremors, or sudden weight gain/loss. However, pt c/o severe cold intolerance.\par  \par Recent laboratory test calcium 9.6, creatinine 0.76, TSH 0.366, hemoglobin A1c 5.7%\par F/u in 6 months

## 2022-05-24 NOTE — PHYSICAL EXAM
[Alert] : alert [Well Nourished] : well nourished [No Acute Distress] : no acute distress [Well Developed] : well developed [Normal Sclera/Conjunctiva] : normal sclera/conjunctiva [EOMI] : extra ocular movement intact [No Proptosis] : no proptosis [Thyroid Not Enlarged] : the thyroid was not enlarged [No Thyroid Nodules] : no palpable thyroid nodules [Clear to Auscultation] : lungs were clear to auscultation bilaterally [Normal S1, S2] : normal S1 and S2 [Normal Rate] : heart rate was normal [Regular Rhythm] : with a regular rhythm [No Edema] : no peripheral edema [Normal Bowel Sounds] : normal bowel sounds [Not Tender] : non-tender [Not Distended] : not distended [Soft] : abdomen soft [Normal Anterior Cervical Nodes] : no anterior cervical lymphadenopathy [No Spinal Tenderness] : no spinal tenderness [Spine Straight] : spine straight [No Stigmata of Cushings Syndrome] : no stigmata of Cushings Syndrome [Normal Gait] : normal gait [Normal Reflexes] : deep tendon reflexes were 2+ and symmetric [No Tremors] : no tremors [Oriented x3] : oriented to person, place, and time [Hirsutism] : no hirsutism [de-identified] : No unwanted hair growth face, chest, back, or abdomen; no loss of hairlinem no alopecia

## 2022-08-16 ENCOUNTER — NON-APPOINTMENT (OUTPATIENT)
Age: 67
End: 2022-08-16

## 2022-09-08 ENCOUNTER — RX RENEWAL (OUTPATIENT)
Age: 67
End: 2022-09-08

## 2022-10-11 ENCOUNTER — RX RENEWAL (OUTPATIENT)
Age: 67
End: 2022-10-11

## 2022-12-02 ENCOUNTER — LABORATORY RESULT (OUTPATIENT)
Age: 67
End: 2022-12-02

## 2022-12-02 ENCOUNTER — APPOINTMENT (OUTPATIENT)
Dept: ENDOCRINOLOGY | Facility: CLINIC | Age: 67
End: 2022-12-02

## 2022-12-02 VITALS — HEIGHT: 65 IN | WEIGHT: 154 LBS | BODY MASS INDEX: 25.66 KG/M2

## 2022-12-02 VITALS
RESPIRATION RATE: 16 BRPM | HEIGHT: 65 IN | DIASTOLIC BLOOD PRESSURE: 78 MMHG | BODY MASS INDEX: 25.66 KG/M2 | WEIGHT: 154 LBS | OXYGEN SATURATION: 98 % | HEART RATE: 64 BPM | SYSTOLIC BLOOD PRESSURE: 117 MMHG

## 2022-12-02 PROCEDURE — 96401 CHEMO ANTI-NEOPL SQ/IM: CPT

## 2022-12-02 PROCEDURE — ZZZZZ: CPT

## 2022-12-02 PROCEDURE — 77080 DXA BONE DENSITY AXIAL: CPT

## 2022-12-02 PROCEDURE — 99214 OFFICE O/P EST MOD 30 MIN: CPT | Mod: 25

## 2022-12-02 RX ORDER — DENOSUMAB 60 MG/ML
60 INJECTION SUBCUTANEOUS
Qty: 1 | Refills: 0 | Status: COMPLETED | OUTPATIENT
Start: 2022-12-02

## 2022-12-02 RX ADMIN — DENOSUMAB 60 MG/ML: 60 INJECTION SUBCUTANEOUS at 00:00

## 2022-12-03 LAB
ALBUMIN SERPL ELPH-MCNC: 4.6 G/DL
ALP BLD-CCNC: 62 U/L
ALT SERPL-CCNC: 18 U/L
ANION GAP SERPL CALC-SCNC: 11 MMOL/L
AST SERPL-CCNC: 25 U/L
BILIRUB SERPL-MCNC: 0.9 MG/DL
BUN SERPL-MCNC: 20 MG/DL
CALCIUM SERPL-MCNC: 10.1 MG/DL
CHLORIDE SERPL-SCNC: 102 MMOL/L
CO2 SERPL-SCNC: 29 MMOL/L
CREAT SERPL-MCNC: 0.76 MG/DL
EGFR: 86 ML/MIN/1.73M2
GLUCOSE SERPL-MCNC: 101 MG/DL
POTASSIUM SERPL-SCNC: 4.5 MMOL/L
PROT SERPL-MCNC: 6.9 G/DL
SODIUM SERPL-SCNC: 141 MMOL/L
T3RU NFR SERPL: 1 TBI
T4 SERPL-MCNC: 9.9 UG/DL
TSH SERPL-ACNC: 0.78 UIU/ML

## 2022-12-03 NOTE — ASSESSMENT
[Denosumab Therapy] : Risks  and benefits of denosumab therapy were discussed with the patient including eczema, cellulitis, osteonecrosis of the jaw and atypical femur fractures [Levothyroxine] : The patient was instructed to take Levothyroxine on an empty stomach, separate from vitamins, and wait at least 30 minutes before eating [FreeTextEntry1] : 66 year-old female with \par \par 1. Osteoporosis: pt had progressive decrease in spinal bone density despite long-term Actonel. H/o previous partial gastrectomy labs from 6/2016 showed normal Vitamin D levels. Began Prolia 2/2017. Tolerating well. No thigh pain, no interval fx. Normal Ca. No ONJ. BMD 2/2018 slightly improved spine. BMD 11/2020 indicates significantly improved now osteopenia in spine, stable normal total hip, improved normal femoral neck, and stable osteopenia in proximal radius. Continue Prolia, from [facility].\par Current research re: continuing Prolia vs change to alternatives such as Reclast, recommendations to continue Prolia,  reviewed.  Richard AS, Ananda T, Charis DUMAS. Treatment With Zoledronate Subsequent to Denosumab in Osteoporosis: A 2-Year Randomized Study. J Bone [place] Res. 2021 Jul;36(7):7400-5208 \par 2. H/o primary hypothyroidism: pt appears clinically and chemically euthyroid on LT4 112 mcg daily. No local neck pain. No dysphagia or dysphonia. No raciness, shakiness, tiredness, or fatigue. No palpitations, tremors, or sudden weight gain/loss. However, pt c/o severe cold intolerance.\par  \par Recent laboratory test calcium 9.6, creatinine 0.76, TSH 0.366, hemoglobin A1c 5.7%\par F/u in 6 months

## 2022-12-03 NOTE — PROCEDURE
[FreeTextEntry1] : Bone mineral density: 12/02/2022\par indication: vs 2020\par spine -2.1 osteopenia, no significant change \par total hip -0.4 normal +0.4%\par femoral neck-0.3 normal +64%\par proximal radius -1.3 osteopenia, no significant change \par \par Bone mineral density November 9, 2020\par indication: Compared to 2018\par spine spine -2.0 excluding L2 osteopenia +7.5%\par total hip -0.7 normal no significant change\par femoral neck -0.8 normal +6.3%\par proximal radius -1.2 osteopenia no significant change\par \par Bone mineral density 2/15/18\par indication: assess response to medication vs 2016\par spine -2.6 osteoporosis prior -2.8\par total hip -0.8 normal  no significant change \par femoral neck -1.2 osteopenia, no significant change \par proximal radius -0.9 normal no prior

## 2022-12-03 NOTE — HISTORY OF PRESENT ILLNESS
[FreeTextEntry1] : No significant interval health changes. No interval surgery, hospitalizations, fractures, or change in medications.\par \par Told of low bone density for many years. She took Fosamax for a few years and stopped because of dental implants. She then restarted rx as Actonel which she took for approximately 5 years. She took this correctly and tolerated this well. No significant osteoporosis related fx. Bone density test in February 2016 spine -2.8 slowly decreasing versus previous studies. Total hip  -0.7, femoral neck -1.3. No fhx of osteoporosis, had stress fx in the feet in the past. Began Prolia Feb 2017. Tolerating well. No thigh pain, no interval fx. Normal Ca. Last DDS within past 6 months. No ONJ. Not planning major dental work. BMD 2/2018 slightly improved spine. BMD 11/2020 indicates significantly improved now osteopenia in spine, stable normal total hip, improved normal femoral neck, and stable osteopenia in proximal radius.\par \par H/o hypothyroidism and is currently on LT4 112 mcg daily. No change in sx on decreased dose. She is tolerating well. No sx of hypothyroidism or hyperthyroidism. No local neck pain. No dysphagia or dysphonia. No raciness, shakiness, tiredness, or fatigue. No palpitations, tremors, or sudden weight gain/loss. However, pt c/o severe cold intolerance.\par \par Told of prediabetes. She had a 25 lbs weight loss, now weight has been stable. She had been suffering from reactive hypoglycemia but this appears resolved on a more carbohydrate consistent diet.\par \par Of note, partial gastrectomy 1981 for bleeding ulcer. Pt underwent Vit D evaluation 2016 and had normal Vit D 25OH level at 34.

## 2022-12-03 NOTE — PHYSICAL EXAM
[Alert] : alert [Well Nourished] : well nourished [No Acute Distress] : no acute distress [Well Developed] : well developed [Normal Sclera/Conjunctiva] : normal sclera/conjunctiva [EOMI] : extra ocular movement intact [No Proptosis] : no proptosis [Thyroid Not Enlarged] : the thyroid was not enlarged [No Thyroid Nodules] : no palpable thyroid nodules [Clear to Auscultation] : lungs were clear to auscultation bilaterally [Normal S1, S2] : normal S1 and S2 [Normal Rate] : heart rate was normal [Regular Rhythm] : with a regular rhythm [No Edema] : no peripheral edema [Normal Bowel Sounds] : normal bowel sounds [Not Tender] : non-tender [Not Distended] : not distended [Soft] : abdomen soft [Normal Anterior Cervical Nodes] : no anterior cervical lymphadenopathy [No Spinal Tenderness] : no spinal tenderness [Spine Straight] : spine straight [No Stigmata of Cushings Syndrome] : no stigmata of Cushings Syndrome [Normal Gait] : normal gait [Normal Reflexes] : deep tendon reflexes were 2+ and symmetric [No Tremors] : no tremors [Oriented x3] : oriented to person, place, and time [Hirsutism] : no hirsutism [de-identified] : No unwanted hair growth face, chest, back, or abdomen; no loss of hairlinem no alopecia

## 2023-04-03 ENCOUNTER — RX RENEWAL (OUTPATIENT)
Age: 68
End: 2023-04-03

## 2023-06-09 ENCOUNTER — MED ADMIN CHARGE (OUTPATIENT)
Age: 68
End: 2023-06-09

## 2023-06-09 ENCOUNTER — APPOINTMENT (OUTPATIENT)
Dept: ENDOCRINOLOGY | Facility: CLINIC | Age: 68
End: 2023-06-09
Payer: COMMERCIAL

## 2023-06-09 PROCEDURE — 96401 CHEMO ANTI-NEOPL SQ/IM: CPT

## 2023-06-09 RX ORDER — DENOSUMAB 60 MG/ML
60 INJECTION SUBCUTANEOUS
Qty: 1 | Refills: 0 | Status: COMPLETED | OUTPATIENT
Start: 2023-06-09

## 2023-06-09 RX ADMIN — DENOSUMAB 60 MG/ML: 60 INJECTION SUBCUTANEOUS at 00:00

## 2023-12-17 NOTE — HISTORY OF PRESENT ILLNESS
Yes [Alendronate (Fosomax)] : Alendronate [Risedronate (Actonel)] : Risedronate [Denosumab (Prolia)] : Denosumab [FreeTextEntry1] : No significant interval health changes. No interval surgery, hospitalizations, fractures, or change in medications.\par \par Told of low bone density for many years. She took Fosamax for a few years and stopped because of dental implants. She then restarted rx as Actonel which she took for approximately 5 years. She took this correctly and tolerated this well. No significant osteoporosis related fx. Bone density test in February 2016 spine -2.8 slowly decreasing versus previous studies. Total hip  -0.7, femoral neck -1.3. No fhx of osteoporosis, had stress fx in the feet in the past. Began Prolia Feb 2017. Tolerating well. No thigh pain, no interval fx. Normal Ca. Last DDS within past 6 months. No ONJ. Not planning major dental work. BMD 2/2018 slightly improved spine. BMD 11/2020 indicates significantly improved now osteopenia in spine, stable normal total hip, improved normal femoral neck, and stable osteopenia in proximal radius.\par \par H/o hypothyroidism and is currently on LT4 112 mcg daily. No change in sx on decreased dose. She is tolerating well. No sx of hypothyroidism or hyperthyroidism. No local neck pain. No dysphagia or dysphonia. No raciness, shakiness, tiredness, or fatigue. No palpitations, tremors, or sudden weight gain/loss. However, pt c/o severe cold intolerance.\par \par Told of prediabetes. She had a 25 lbs weight loss, now weight has been stable. She had been suffering from reactive hypoglycemia but this appears resolved on a more carbohydrate consistent diet.\par \par Of note, partial gastrectomy 1981 for bleeding ulcer. Pt underwent Vit D evaluation 2016 and had normal Vit D 25OH level at 34.

## 2023-12-20 ENCOUNTER — APPOINTMENT (OUTPATIENT)
Dept: ENDOCRINOLOGY | Facility: CLINIC | Age: 68
End: 2023-12-20
Payer: COMMERCIAL

## 2023-12-20 VITALS
BODY MASS INDEX: 27.32 KG/M2 | HEART RATE: 70 BPM | SYSTOLIC BLOOD PRESSURE: 110 MMHG | WEIGHT: 164 LBS | HEIGHT: 65 IN | DIASTOLIC BLOOD PRESSURE: 78 MMHG | OXYGEN SATURATION: 98 %

## 2023-12-20 DIAGNOSIS — E03.9 HYPOTHYROIDISM, UNSPECIFIED: ICD-10-CM

## 2023-12-20 PROCEDURE — 96401 CHEMO ANTI-NEOPL SQ/IM: CPT

## 2023-12-20 PROCEDURE — 99214 OFFICE O/P EST MOD 30 MIN: CPT | Mod: 25

## 2023-12-20 RX ORDER — DENOSUMAB 60 MG/ML
60 INJECTION SUBCUTANEOUS
Qty: 1 | Refills: 0 | Status: COMPLETED | OUTPATIENT
Start: 2023-12-20

## 2023-12-20 RX ADMIN — DENOSUMAB 60 MG/ML: 60 INJECTION SUBCUTANEOUS at 00:00

## 2023-12-21 LAB
ALBUMIN SERPL ELPH-MCNC: 4.5 G/DL
ALP BLD-CCNC: 70 U/L
ALT SERPL-CCNC: 57 U/L
ANION GAP SERPL CALC-SCNC: 13 MMOL/L
AST SERPL-CCNC: 56 U/L
BILIRUB SERPL-MCNC: 1 MG/DL
BUN SERPL-MCNC: 24 MG/DL
CALCIUM SERPL-MCNC: 9.7 MG/DL
CHLORIDE SERPL-SCNC: 102 MMOL/L
CHOLEST SERPL-MCNC: 301 MG/DL
CO2 SERPL-SCNC: 25 MMOL/L
CREAT SERPL-MCNC: 0.7 MG/DL
EGFR: 94 ML/MIN/1.73M2
GLUCOSE SERPL-MCNC: 91 MG/DL
HDLC SERPL-MCNC: 117 MG/DL
LDLC SERPL CALC-MCNC: 176 MG/DL
NONHDLC SERPL-MCNC: 184 MG/DL
POTASSIUM SERPL-SCNC: 3.8 MMOL/L
PROT SERPL-MCNC: 6.8 G/DL
SODIUM SERPL-SCNC: 141 MMOL/L
T4 FREE SERPL-MCNC: 1.3 NG/DL
TRIGL SERPL-MCNC: 59 MG/DL
TSH SERPL-ACNC: 1.65 UIU/ML

## 2023-12-21 NOTE — HISTORY OF PRESENT ILLNESS
[FreeTextEntry1] : No significant interval health changes. No interval surgery, hospitalizations, fractures, or change in medications.  Told of low bone density for many years. She took Fosamax for a few years and stopped because of dental implants. She then restarted rx as Actonel which she took for approximately 5 years. She took this correctly and tolerated this well. No significant osteoporosis related fx. Bone density test in February 2016 spine -2.8 slowly decreasing versus previous studies. Total hip  -0.7, femoral neck -1.3. No fhx of osteoporosis, had stress fx in the feet in the past. Began Prolia Feb 2017. Tolerating well. No thigh pain, no interval fx. Normal Ca. Last DDS within past 6 months. No ONJ. Not planning major dental work. BMD 2/2018 slightly improved spine. BMD 11/2020 indicates significantly improved now osteopenia in spine, stable normal total hip, improved normal femoral neck, and stable osteopenia in proximal radius.  H/o hypothyroidism and is currently on LT4 110 mcg daily down from 112 mcg since April 2023 by Dr. Suarez. No change in sx on decreased dose. She is tolerating well. No sx of hypothyroidism or hyperthyroidism. No local neck pain. No dysphagia or dysphonia. No raciness, shakiness, tiredness, or fatigue. No palpitations, tremors, or sudden weight gain/loss. However, pt c/o severe cold intolerance.  Told of prediabetes. She had a 25 lbs weight loss, now weight has been stable. She had been suffering from reactive hypoglycemia but this appears resolved on a more carbohydrate consistent diet.  Of note, partial gastrectomy 1981 for bleeding ulcer. Pt underwent Vit D evaluation 2016 and had normal Vit D 25OH level at 34.

## 2023-12-21 NOTE — PHYSICAL EXAM
[Alert] : alert [Well Nourished] : well nourished [No Acute Distress] : no acute distress [Well Developed] : well developed [Normal Sclera/Conjunctiva] : normal sclera/conjunctiva [EOMI] : extra ocular movement intact [No Proptosis] : no proptosis [Thyroid Not Enlarged] : the thyroid was not enlarged [No Thyroid Nodules] : no palpable thyroid nodules [Clear to Auscultation] : lungs were clear to auscultation bilaterally [Normal S1, S2] : normal S1 and S2 [Normal Rate] : heart rate was normal [Regular Rhythm] : with a regular rhythm [No Edema] : no peripheral edema [Normal Bowel Sounds] : normal bowel sounds [Not Tender] : non-tender [Not Distended] : not distended [Soft] : abdomen soft [Normal Anterior Cervical Nodes] : no anterior cervical lymphadenopathy [No Spinal Tenderness] : no spinal tenderness [Spine Straight] : spine straight [No Stigmata of Cushings Syndrome] : no stigmata of Cushings Syndrome [Normal Gait] : normal gait [Normal Reflexes] : deep tendon reflexes were 2+ and symmetric [No Tremors] : no tremors [Oriented x3] : oriented to person, place, and time [Hirsutism] : no hirsutism [de-identified] : No unwanted hair growth face, chest, back, or abdomen; no loss of hairlinem no alopecia

## 2023-12-21 NOTE — ASSESSMENT
[Denosumab Therapy] : Risks  and benefits of denosumab therapy were discussed with the patient including eczema, cellulitis, osteonecrosis of the jaw and atypical femur fractures [Levothyroxine] : The patient was instructed to take Levothyroxine on an empty stomach, separate from vitamins, and wait at least 30 minutes before eating [FreeTextEntry1] : 68 year-old female with  1. Osteoporosis: pt had progressive decrease in spinal bone density despite long-term Actonel. H/o previous partial gastrectomy labs from 6/2016 showed normal Vitamin D levels. Began Prolia 2/2017. Tolerating well. No thigh pain, no interval fx. Normal Ca. No ONJ. BMD 2/2018 slightly improved spine. BMD 11/2020 indicates significantly improved now osteopenia in spine, stable normal total hip, improved normal femoral neck, and stable osteopenia in proximal radius. BMD 12/2022 6% increase in the fem neck, all other sites stable. Continue Prolia, from [facility].  Current research re: continuing Prolia vs change to alternatives such as Reclast, recommendations to continue Prolia, reviewed. Suzy AS, Dulce Maria T, Charis DUMAS. Treatment With Zoledronate Subsequent to Denosumab in Osteoporosis: A 2-Year Randomized Study. J Bone [place] Res. 2021 Jul;36(7):6946-1534  2. H/o primary hypothyroidism: pt appears clinically and chemically euthyroid on LT4 110 mcg daily down from 112 mcg since April 2023 by Dr. Suarez. No local neck pain. No dysphagia or dysphonia. No raciness, shakiness, tiredness, or fatigue. No palpitations, tremors, or sudden weight gain/loss. However, pt c/o severe cold intolerance.  Repeat blood work today including TFTs. F/u in 6 months for Prolia with RN and BMD in 1 year.

## 2023-12-21 NOTE — PROCEDURE
[FreeTextEntry1] : Bone mineral density: 12/02/2022 indication: vs 2020 spine -2.1 osteopenia, no significant change  total hip -0.4 normal +0.4% femoral neck-0.3 normal +6.4% proximal radius -1.3 osteopenia, no significant change   Bone mineral density November 9, 2020 indication: Compared to 2018 spine spine -2.0 excluding L2 osteopenia +7.5% total hip -0.7 normal no significant change femoral neck -0.8 normal +6.3% proximal radius -1.2 osteopenia no significant change  Bone mineral density 2/15/18 indication: assess response to medication vs 2016 spine -2.6 osteoporosis prior -2.8 total hip -0.8 normal  no significant change  femoral neck -1.2 osteopenia, no significant change  proximal radius -0.9 normal no prior

## 2023-12-22 ENCOUNTER — TRANSCRIPTION ENCOUNTER (OUTPATIENT)
Age: 68
End: 2023-12-22

## 2024-02-05 ENCOUNTER — NON-APPOINTMENT (OUTPATIENT)
Age: 69
End: 2024-02-05

## 2024-03-29 ENCOUNTER — RX RENEWAL (OUTPATIENT)
Age: 69
End: 2024-03-29

## 2024-03-29 RX ORDER — DENOSUMAB 60 MG/ML
60 INJECTION SUBCUTANEOUS
Qty: 1 | Refills: 1 | Status: ACTIVE | COMMUNITY
Start: 2019-08-16 | End: 1900-01-01

## 2024-06-26 ENCOUNTER — APPOINTMENT (OUTPATIENT)
Dept: ENDOCRINOLOGY | Facility: CLINIC | Age: 69
End: 2024-06-26
Payer: COMMERCIAL

## 2024-06-26 DIAGNOSIS — M81.0 AGE-RELATED OSTEOPOROSIS W/OUT CURRENT PATHOLOGICAL FRACTURE: ICD-10-CM

## 2024-06-26 PROCEDURE — 96401 CHEMO ANTI-NEOPL SQ/IM: CPT

## 2024-06-26 RX ORDER — DENOSUMAB 60 MG/ML
60 INJECTION SUBCUTANEOUS
Qty: 1 | Refills: 0 | Status: COMPLETED | OUTPATIENT
Start: 2024-06-25

## 2025-01-30 ENCOUNTER — APPOINTMENT (OUTPATIENT)
Dept: ENDOCRINOLOGY | Facility: CLINIC | Age: 70
End: 2025-01-30

## 2025-01-30 VITALS
WEIGHT: 168 LBS | DIASTOLIC BLOOD PRESSURE: 72 MMHG | OXYGEN SATURATION: 99 % | HEART RATE: 63 BPM | BODY MASS INDEX: 27.99 KG/M2 | SYSTOLIC BLOOD PRESSURE: 122 MMHG | HEIGHT: 65 IN

## 2025-01-30 DIAGNOSIS — M81.0 AGE-RELATED OSTEOPOROSIS W/OUT CURRENT PATHOLOGICAL FRACTURE: ICD-10-CM

## 2025-01-30 DIAGNOSIS — E03.9 HYPOTHYROIDISM, UNSPECIFIED: ICD-10-CM

## 2025-01-30 PROCEDURE — 99214 OFFICE O/P EST MOD 30 MIN: CPT | Mod: 25

## 2025-01-30 PROCEDURE — 77080 DXA BONE DENSITY AXIAL: CPT

## 2025-01-30 PROCEDURE — ZZZZZ: CPT

## 2025-01-30 PROCEDURE — 96401 CHEMO ANTI-NEOPL SQ/IM: CPT

## 2025-01-30 RX ORDER — DENOSUMAB 60 MG/ML
60 INJECTION SUBCUTANEOUS
Qty: 1 | Refills: 0 | Status: COMPLETED | OUTPATIENT
Start: 2025-01-30

## 2025-01-30 RX ADMIN — DENOSUMAB 60 MG/ML: 60 INJECTION SUBCUTANEOUS at 00:00

## 2025-01-31 RX ORDER — ROSUVASTATIN CALCIUM 5 MG/1
5 TABLET, FILM COATED ORAL
Refills: 0 | Status: ACTIVE | COMMUNITY

## 2025-03-28 ENCOUNTER — RX RENEWAL (OUTPATIENT)
Age: 70
End: 2025-03-28

## 2025-07-30 ENCOUNTER — NON-APPOINTMENT (OUTPATIENT)
Age: 70
End: 2025-07-30

## 2025-08-01 ENCOUNTER — APPOINTMENT (OUTPATIENT)
Dept: ENDOCRINOLOGY | Facility: CLINIC | Age: 70
End: 2025-08-01
Payer: COMMERCIAL

## 2025-08-01 VITALS
WEIGHT: 167 LBS | OXYGEN SATURATION: 98 % | HEART RATE: 64 BPM | SYSTOLIC BLOOD PRESSURE: 118 MMHG | DIASTOLIC BLOOD PRESSURE: 70 MMHG | HEIGHT: 65 IN | BODY MASS INDEX: 27.82 KG/M2

## 2025-08-01 DIAGNOSIS — E03.9 HYPOTHYROIDISM, UNSPECIFIED: ICD-10-CM

## 2025-08-01 DIAGNOSIS — M81.0 AGE-RELATED OSTEOPOROSIS W/OUT CURRENT PATHOLOGICAL FRACTURE: ICD-10-CM

## 2025-08-01 PROCEDURE — G2211 COMPLEX E/M VISIT ADD ON: CPT | Mod: NC

## 2025-08-01 PROCEDURE — 99214 OFFICE O/P EST MOD 30 MIN: CPT

## 2025-08-01 RX ADMIN — ZOLEDRONIC ACID 5 MG/100ML: 5 INJECTION INTRAVENOUS at 00:00

## 2025-08-02 LAB
25(OH)D3 SERPL-MCNC: 41.1 NG/ML
ALBUMIN SERPL ELPH-MCNC: 4.2 G/DL
ALP BLD-CCNC: 64 U/L
ALT SERPL-CCNC: 22 U/L
ANION GAP SERPL CALC-SCNC: 15 MMOL/L
AST SERPL-CCNC: 31 U/L
BILIRUB SERPL-MCNC: 0.8 MG/DL
BUN SERPL-MCNC: 21 MG/DL
CALCIUM SERPL-MCNC: 9.6 MG/DL
CALCIUM SERPL-MCNC: 9.6 MG/DL
CHLORIDE SERPL-SCNC: 104 MMOL/L
CO2 SERPL-SCNC: 24 MMOL/L
CREAT SERPL-MCNC: 0.69 MG/DL
EGFRCR SERPLBLD CKD-EPI 2021: 94 ML/MIN/1.73M2
GLUCOSE SERPL-MCNC: 86 MG/DL
PARATHYROID HORMONE INTACT: 30 PG/ML
PHOSPHATE SERPL-MCNC: 3.4 MG/DL
POTASSIUM SERPL-SCNC: 5.2 MMOL/L
PROT SERPL-MCNC: 6.4 G/DL
SODIUM SERPL-SCNC: 143 MMOL/L
T4 FREE SERPL-MCNC: 1.5 NG/DL
TSH SERPL-ACNC: 0.28 UIU/ML

## 2025-08-12 LAB — COLLAGEN CTX SERPL-MCNC: 67 PG/ML

## 2025-08-25 ENCOUNTER — APPOINTMENT (OUTPATIENT)
Dept: RHEUMATOLOGY | Facility: CLINIC | Age: 70
End: 2025-08-25
Payer: COMMERCIAL

## 2025-08-25 VITALS
SYSTOLIC BLOOD PRESSURE: 114 MMHG | OXYGEN SATURATION: 96 % | HEART RATE: 75 BPM | TEMPERATURE: 98.1 F | DIASTOLIC BLOOD PRESSURE: 73 MMHG | RESPIRATION RATE: 16 BRPM

## 2025-08-25 VITALS — SYSTOLIC BLOOD PRESSURE: 115 MMHG | HEART RATE: 67 BPM | OXYGEN SATURATION: 95 % | DIASTOLIC BLOOD PRESSURE: 72 MMHG

## 2025-08-25 PROCEDURE — 96374 THER/PROPH/DIAG INJ IV PUSH: CPT
